# Patient Record
Sex: MALE | Race: WHITE | NOT HISPANIC OR LATINO | URBAN - METROPOLITAN AREA
[De-identification: names, ages, dates, MRNs, and addresses within clinical notes are randomized per-mention and may not be internally consistent; named-entity substitution may affect disease eponyms.]

---

## 2019-05-14 ENCOUNTER — EMERGENCY (EMERGENCY)
Facility: HOSPITAL | Age: 24
LOS: 1 days | Discharge: HOME | End: 2019-05-14
Attending: EMERGENCY MEDICINE
Payer: SELF-PAY

## 2019-05-14 VITALS
RESPIRATION RATE: 16 BRPM | HEART RATE: 109 BPM | HEIGHT: 70 IN | WEIGHT: 225.09 LBS | TEMPERATURE: 98 F | DIASTOLIC BLOOD PRESSURE: 87 MMHG | SYSTOLIC BLOOD PRESSURE: 137 MMHG | OXYGEN SATURATION: 100 %

## 2019-05-14 VITALS — HEART RATE: 93 BPM

## 2019-05-14 RX ORDER — IBUPROFEN 200 MG
600 TABLET ORAL ONCE
Refills: 0 | Status: COMPLETED | OUTPATIENT
Start: 2019-05-14 | End: 2019-05-14

## 2019-05-14 RX ADMIN — Medication 600 MILLIGRAM(S): at 21:47

## 2019-05-14 NOTE — ED ADULT NURSE NOTE - NSIMPLEMENTINTERV_GEN_ALL_ED
Implemented All Universal Safety Interventions:  [place] to call system. Call bell, personal items and telephone within reach. Instruct patient to call for assistance. Room bathroom lighting operational. Non-slip footwear when patient is off stretcher. Physically safe environment: no spills, clutter or unnecessary equipment. Stretcher in lowest position, wheels locked, appropriate side rails in place.
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
## 2019-05-14 NOTE — ED ADULT NURSE NOTE - OBJECTIVE STATEMENT
Pt c/o right hand pain s/p accidentally punching/hitting a door while at work ([employer]).  Able to open/close hand but reports with pain.  No deformity, swelling, ecchymosis.  No pain meds yet.  Well appearing, conversive, no distress.

## 2019-05-14 NOTE — ED PROVIDER NOTE - NSFOLLOWUPINSTRUCTIONS_ED_ALL_ED_FT
rest, increase activity as tolerated.   Return to the ER for any concerns -    -- Please use 650mg Tylenol (also called acetaminophen) every 4 hours & 600mg Motrin (also called Advil or ibuprofen) every 6 hours as needed for pain/discomfort/swelling. You can get these without a prescription. Don't use more than 3500mg of Tylenol in any 24-hour period. Make sure your other prescription/over-the-counter medications don't contain any Tylenol so you don't take too much. If you have any stomach discomfort while taking Motrin, you can use TUMS or Pepcid or Zantac (these can all be bought without a prescription).
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
## 2021-09-09 ENCOUNTER — EMERGENCY (EMERGENCY)
Facility: HOSPITAL | Age: 26
LOS: 1 days | Discharge: HOME | End: 2021-09-09
Attending: EMERGENCY MEDICINE
Payer: SELF-PAY

## 2021-09-09 VITALS
RESPIRATION RATE: 18 BRPM | DIASTOLIC BLOOD PRESSURE: 75 MMHG | SYSTOLIC BLOOD PRESSURE: 137 MMHG | OXYGEN SATURATION: 98 % | HEART RATE: 98 BPM | TEMPERATURE: 98 F | WEIGHT: 225.09 LBS | HEIGHT: 70 IN

## 2021-09-09 PROBLEM — Z78.9 OTHER SPECIFIED HEALTH STATUS: Chronic | Status: ACTIVE | Noted: 2019-05-18

## 2021-09-09 RX ORDER — IBUPROFEN 200 MG
400 TABLET ORAL ONCE
Refills: 0 | Status: COMPLETED | OUTPATIENT
Start: 2021-09-09 | End: 2021-09-09

## 2021-09-09 RX ADMIN — Medication 400 MILLIGRAM(S): at 01:17

## 2021-09-09 NOTE — ED PROVIDER NOTE - PATIENT PORTAL LINK FT
You can access the FollowMyHealth Patient Portal offered by [facility] by registering at the following website: [email or web address]. By joining [facility]’s FollowMyHealth portal, you will also be able to view your health information using other applications (apps) compatible with our system.

## 2021-09-09 NOTE — ED PROVIDER NOTE - OBJECTIVE STATEMENT
26y M [employer] w/ no significant PMHx presenting with R knee pain after fall. States he lost his balance and landed on his knee, stood up and felt his knee "buckle". States his knee feels swollen and endorses pain with "twisting movements". Able to bear weight and ambulate independently with minor pain. Did not take pain medication prior to arrival. No abrasions, numbness/tingling.

## 2021-09-09 NOTE — ED PROVIDER NOTE - NSFOLLOWUPCLINICS_GEN_ALL_ED_FT
[facility] Orthopedic Surgery  Orthopedic Surgery  [address], [address]rd & [address] [place] [address]  Phone: [phone]  Fax:

## 2021-09-09 NOTE — ED PROVIDER NOTE - CLINICAL SUMMARY MEDICAL DECISION MAKING FREE TEXT BOX
26y M [employer] w/ no significant PMHx presenting with R knee pain after fall. +pain with anterior drawer test, able to ambulate independently without difficulty, no bony tenderness, no patellar swelling, no abrasions or bruising. FROM. Will treat pain w/ Motrin, place in knee immobilizer, dc w/ ortho f/u for MRI. Possible ligamentous injury, no clinical suspicion of fracture that warrants xray at this time.

## 2021-09-09 NOTE — ED ADULT NURSE NOTE - OBJECTIVE STATEMENT
27y/o M coming to the ED s/p fall. Pt states that he lost his balance landing on his R knee and when he stood up he felt a buckling. Pt c.o of swelling of his 27y/o M coming to the ED s/p fall. Pt states that he lost his balance landing on his R knee and when he stood up he felt a buckling. Pt c.o of swelling of his R knee & redness to R knee. Pt denies any CP/SOB/Fever/Chills/N/V/D. On exam, pt has positive ROM. VSS.

## 2021-09-09 NOTE — ED PROVIDER NOTE - PHYSICAL EXAMINATION
Physical Exam:  Gen: NAD, able to ambulate without assistance  Lung: CTAB  CV: RRR, no murmurs, rubs or gallops  MSK: no visible deformities, no bony tenderness, ROM WNL for B/L LE, no patellar tenderness, +pain on anterior drawer test   Neuro: No focal sensory or motor deficits, MS 5/5   Skin: no abrasions, ecchymosis or edema   Anika Arriaza D.O. -Resident

## 2021-09-09 NOTE — ED PROVIDER NOTE - PROGRESS NOTE DETAILS
Arsalan CHAMBERS (PGY-3): knee immobilizer placed,  follow-up instructions given. Patient is agreeable with plan, addressed all questions and concerns at this time. Will dc w/ Ortho referral for MRI.

## 2021-09-09 NOTE — ED PROVIDER NOTE - ATTENDING CONTRIBUTION TO CARE
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

## 2021-09-09 NOTE — ED PROVIDER NOTE - NSFOLLOWUPINSTRUCTIONS_ED_ALL_ED_FT
- Please call to schedule an appointment with Ortho in 7-10 days if your pain persists to have an MRI to rule out potential ligamentous injury    - Return to the ED for any new, worsening, or concerning symptoms to you    - Take ibuprofen/tylenol as directed as needed for pain  To control your pain at home, you should take Ibuprofen 400 mg along with Tylenol 650mg-1000mg every 6 to 8 hours. Limit your maximum daily Tylenol from all sources to 4000mg. Be aware that many other medications contain acetaminophen which is also known as Tylenol. Taking Tylenol and Ibuprofen together has been shown to be more effective at relieving pain than taking them separately. These are both over the counter medications that you can  at your local pharmacy without a prescription. You need to respect all of the warnings on the bottles. You shouldn’t take these medications for more than a week without following up with your doctor. Both medications come with certain risks and side effects that you need to discuss with your doctor, especially if you are taking them for a prolonged period.    -Keep your knee elevated, ice 20 min on/ 20 min off

## 2021-09-09 NOTE — ED PROVIDER NOTE - NS ED ROS FT
CONSTITUTIONAL: no lightheadedness, no dizziness  CV: No chest pain, no palpitations  RESP: No SOB  MSK: +R knee pain   SKIN: no rashes, no abrasions   NEURO: no decreased sensation/paresthesias

## 2022-09-28 NOTE — ED PROVIDER NOTE - CROS ED CARDIOVAS ALL NEG
[address] [facility] or [address] with any questions regarding isolation timeframe/return to work/school details. negative...